# Patient Record
Sex: MALE | Race: WHITE | NOT HISPANIC OR LATINO | Employment: OTHER | URBAN - METROPOLITAN AREA
[De-identification: names, ages, dates, MRNs, and addresses within clinical notes are randomized per-mention and may not be internally consistent; named-entity substitution may affect disease eponyms.]

---

## 2017-08-10 ENCOUNTER — PREPPED CHART (OUTPATIENT)
Dept: URBAN - METROPOLITAN AREA CLINIC 43 | Facility: CLINIC | Age: 67
End: 2017-08-10

## 2017-12-07 ENCOUNTER — ESTABLISHED COMPREHENSIVE EXAM (OUTPATIENT)
Dept: URBAN - METROPOLITAN AREA CLINIC 43 | Facility: CLINIC | Age: 67
End: 2017-12-07

## 2017-12-07 DIAGNOSIS — Z79.4: ICD-10-CM

## 2017-12-07 DIAGNOSIS — E11.9: ICD-10-CM

## 2017-12-07 DIAGNOSIS — Z96.1: ICD-10-CM

## 2017-12-07 PROCEDURE — 1036F TOBACCO NON-USER: CPT

## 2017-12-07 PROCEDURE — 92014 COMPRE OPH EXAM EST PT 1/>: CPT

## 2017-12-07 PROCEDURE — G8785 BP SCRN NO PERF AT INTERVAL: HCPCS

## 2017-12-07 PROCEDURE — G8428 CUR MEDS NOT DOCUMENT: HCPCS

## 2017-12-07 PROCEDURE — 2022F DILAT RTA XM EVC RTNOPTHY: CPT

## 2017-12-07 PROCEDURE — 92015 DETERMINE REFRACTIVE STATE: CPT

## 2017-12-07 ASSESSMENT — VISUAL ACUITY
OD_SC: J1
OS_SC: 20/40+2
OS_SC: J6
OD_SC: 20/40+2

## 2017-12-07 ASSESSMENT — TONOMETRY
OS_IOP_MMHG: 14
OD_IOP_MMHG: 13

## 2018-12-06 ENCOUNTER — ESTABLISHED COMPREHENSIVE EXAM (OUTPATIENT)
Dept: URBAN - METROPOLITAN AREA CLINIC 43 | Facility: CLINIC | Age: 68
End: 2018-12-06

## 2018-12-06 DIAGNOSIS — Z79.4: ICD-10-CM

## 2018-12-06 DIAGNOSIS — Z96.1: ICD-10-CM

## 2018-12-06 DIAGNOSIS — H43.393: ICD-10-CM

## 2018-12-06 DIAGNOSIS — E11.3293: ICD-10-CM

## 2018-12-06 DIAGNOSIS — H26.493: ICD-10-CM

## 2018-12-06 PROCEDURE — 92014 COMPRE OPH EXAM EST PT 1/>: CPT

## 2018-12-06 PROCEDURE — 92015 DETERMINE REFRACTIVE STATE: CPT

## 2018-12-06 ASSESSMENT — VISUAL ACUITY
OD_SC: J1
OS_SC: 20/40+1
OD_BAT: 20/400
OS_BAT: 20/80
OD_SC: 20/40-1
OS_SC: J8

## 2018-12-06 ASSESSMENT — TONOMETRY
OD_IOP_MMHG: 12
OS_IOP_MMHG: 16

## 2019-12-06 ENCOUNTER — ESTABLISHED COMPREHENSIVE EXAM (OUTPATIENT)
Dept: URBAN - METROPOLITAN AREA CLINIC 43 | Facility: CLINIC | Age: 69
End: 2019-12-06

## 2019-12-06 DIAGNOSIS — H26.493: ICD-10-CM

## 2019-12-06 DIAGNOSIS — Z96.1: ICD-10-CM

## 2019-12-06 DIAGNOSIS — H43.393: ICD-10-CM

## 2019-12-06 DIAGNOSIS — Z79.4: ICD-10-CM

## 2019-12-06 DIAGNOSIS — E11.3291: ICD-10-CM

## 2019-12-06 PROCEDURE — 92015 DETERMINE REFRACTIVE STATE: CPT

## 2019-12-06 PROCEDURE — 92014 COMPRE OPH EXAM EST PT 1/>: CPT

## 2019-12-06 ASSESSMENT — VISUAL ACUITY
OS_SC: J8
OD_SC: J1
OS_SC: 20/30
OD_SC: 20/30+2

## 2019-12-06 ASSESSMENT — TONOMETRY
OD_IOP_MMHG: 14
OS_IOP_MMHG: 14

## 2020-12-07 ENCOUNTER — ESTABLISHED COMPREHENSIVE EXAM (OUTPATIENT)
Dept: URBAN - METROPOLITAN AREA CLINIC 43 | Facility: CLINIC | Age: 70
End: 2020-12-07

## 2020-12-07 DIAGNOSIS — H26.493: ICD-10-CM

## 2020-12-07 DIAGNOSIS — Z96.1: ICD-10-CM

## 2020-12-07 DIAGNOSIS — E11.3291: ICD-10-CM

## 2020-12-07 DIAGNOSIS — Z79.4: ICD-10-CM

## 2020-12-07 PROCEDURE — 92250 FUNDUS PHOTOGRAPHY W/I&R: CPT

## 2020-12-07 PROCEDURE — 92015 DETERMINE REFRACTIVE STATE: CPT

## 2020-12-07 PROCEDURE — 92014 COMPRE OPH EXAM EST PT 1/>: CPT

## 2020-12-07 ASSESSMENT — VISUAL ACUITY
OD_SC: J1-
OD_SC: 20/40
OS_SC: J6-
OS_SC: 20/40

## 2020-12-07 ASSESSMENT — TONOMETRY
OS_IOP_MMHG: 17
OD_IOP_MMHG: 19

## 2021-12-07 ENCOUNTER — COMPREHENSIVE EXAM (OUTPATIENT)
Dept: URBAN - METROPOLITAN AREA CLINIC 43 | Facility: CLINIC | Age: 71
End: 2021-12-07

## 2021-12-07 DIAGNOSIS — H26.493: ICD-10-CM

## 2021-12-07 DIAGNOSIS — Z96.1: ICD-10-CM

## 2021-12-07 DIAGNOSIS — H43.393: ICD-10-CM

## 2021-12-07 DIAGNOSIS — Z79.4: ICD-10-CM

## 2021-12-07 DIAGNOSIS — E11.3291: ICD-10-CM

## 2021-12-07 PROCEDURE — 92014 COMPRE OPH EXAM EST PT 1/>: CPT

## 2021-12-07 PROCEDURE — 92015 DETERMINE REFRACTIVE STATE: CPT

## 2021-12-07 ASSESSMENT — TONOMETRY
OS_IOP_MMHG: 15
OD_IOP_MMHG: 14

## 2021-12-07 ASSESSMENT — VISUAL ACUITY
OS_SC: 20/25-1
OD_SC: 20/40
OD_SC: J3
OS_SC: J4

## 2022-12-07 ENCOUNTER — COMPREHENSIVE EXAM (OUTPATIENT)
Dept: URBAN - METROPOLITAN AREA CLINIC 43 | Facility: CLINIC | Age: 72
End: 2022-12-07

## 2022-12-07 PROCEDURE — 92015 DETERMINE REFRACTIVE STATE: CPT

## 2022-12-07 PROCEDURE — 92014 COMPRE OPH EXAM EST PT 1/>: CPT

## 2022-12-07 PROCEDURE — 92250 FUNDUS PHOTOGRAPHY W/I&R: CPT

## 2022-12-07 ASSESSMENT — TONOMETRY
OS_IOP_MMHG: 16
OD_IOP_MMHG: 17

## 2022-12-07 ASSESSMENT — VISUAL ACUITY
OS_SC: 20/30
OD_SC: 20/40
OD_SC: J1
OS_SC: J6

## 2023-12-07 ENCOUNTER — COMPREHENSIVE EXAM (OUTPATIENT)
Dept: URBAN - METROPOLITAN AREA CLINIC 43 | Facility: CLINIC | Age: 73
End: 2023-12-07

## 2023-12-07 DIAGNOSIS — E11.3291: ICD-10-CM

## 2023-12-07 DIAGNOSIS — H26.493: ICD-10-CM

## 2023-12-07 DIAGNOSIS — H43.393: ICD-10-CM

## 2023-12-07 DIAGNOSIS — Z96.1: ICD-10-CM

## 2023-12-07 DIAGNOSIS — Z79.4: ICD-10-CM

## 2023-12-07 PROCEDURE — 92014 COMPRE OPH EXAM EST PT 1/>: CPT

## 2023-12-07 ASSESSMENT — TONOMETRY
OD_IOP_MMHG: 15
OS_IOP_MMHG: 14

## 2023-12-07 ASSESSMENT — VISUAL ACUITY
OD_SC: 20/25
OD_SC: J1
OS_SC: 20/25
OS_SC: J6

## 2024-12-06 ENCOUNTER — COMPREHENSIVE EXAM (OUTPATIENT)
Age: 74
End: 2024-12-06

## 2024-12-06 DIAGNOSIS — Z79.4: ICD-10-CM

## 2024-12-06 DIAGNOSIS — H43.393: ICD-10-CM

## 2024-12-06 DIAGNOSIS — E11.3293: ICD-10-CM

## 2024-12-06 DIAGNOSIS — H26.493: ICD-10-CM

## 2024-12-06 DIAGNOSIS — Z96.1: ICD-10-CM

## 2024-12-06 PROCEDURE — 92250 FUNDUS PHOTOGRAPHY W/I&R: CPT

## 2024-12-06 PROCEDURE — 92014 COMPRE OPH EXAM EST PT 1/>: CPT
